# Patient Record
Sex: FEMALE | Race: WHITE | Employment: FULL TIME | URBAN - METROPOLITAN AREA
[De-identification: names, ages, dates, MRNs, and addresses within clinical notes are randomized per-mention and may not be internally consistent; named-entity substitution may affect disease eponyms.]

---

## 2017-01-27 ENCOUNTER — OFFICE VISIT (OUTPATIENT)
Dept: INTERNAL MEDICINE CLINIC | Facility: CLINIC | Age: 27
End: 2017-01-27

## 2017-01-27 VITALS
DIASTOLIC BLOOD PRESSURE: 68 MMHG | RESPIRATION RATE: 16 BRPM | HEIGHT: 64 IN | SYSTOLIC BLOOD PRESSURE: 114 MMHG | HEART RATE: 64 BPM | WEIGHT: 203 LBS | BODY MASS INDEX: 34.66 KG/M2

## 2017-01-27 DIAGNOSIS — F32.A DEPRESSION, UNSPECIFIED DEPRESSION TYPE: ICD-10-CM

## 2017-01-27 DIAGNOSIS — E66.9 OBESITY (BMI 30-39.9): ICD-10-CM

## 2017-01-27 DIAGNOSIS — Z51.81 ENCOUNTER FOR THERAPEUTIC DRUG MONITORING: Primary | ICD-10-CM

## 2017-01-27 PROCEDURE — 99213 OFFICE O/P EST LOW 20 MIN: CPT | Performed by: INTERNAL MEDICINE

## 2017-01-27 NOTE — PROGRESS NOTES
CC: Patient presents with:  Weight Check: up 1 lb       HPI:   Obesity, doing well on topamax and metformin, no chest pan. Mood is good. Current Outpatient Prescriptions:  Empagliflozin (JARDIANCE) 10 MG Oral Tab Take 10 mg by mouth daily.  Disp auscultation bilat   CARDIO: RRR without murmur    No orders of the defined types were placed in this encounter. Signed Prescriptions Disp Refills    Empagliflozin (JARDIANCE) 10 MG Oral Tab 30 tablet 5      Sig: Take 10 mg by mouth daily.

## 2017-02-09 ENCOUNTER — TELEPHONE (OUTPATIENT)
Dept: FAMILY MEDICINE CLINIC | Facility: CLINIC | Age: 27
End: 2017-02-09

## 2017-03-09 ENCOUNTER — OFFICE VISIT (OUTPATIENT)
Dept: INTERNAL MEDICINE CLINIC | Facility: CLINIC | Age: 27
End: 2017-03-09

## 2017-03-09 VITALS
RESPIRATION RATE: 16 BRPM | HEART RATE: 68 BPM | WEIGHT: 205 LBS | SYSTOLIC BLOOD PRESSURE: 128 MMHG | DIASTOLIC BLOOD PRESSURE: 78 MMHG | BODY MASS INDEX: 35 KG/M2 | HEIGHT: 64 IN

## 2017-03-09 DIAGNOSIS — Z51.81 ENCOUNTER FOR THERAPEUTIC DRUG MONITORING: Primary | ICD-10-CM

## 2017-03-09 DIAGNOSIS — Z15.89 HETEROZYGOUS MTHFR MUTATION C677T: ICD-10-CM

## 2017-03-09 DIAGNOSIS — F32.A DEPRESSION, UNSPECIFIED DEPRESSION TYPE: ICD-10-CM

## 2017-03-09 DIAGNOSIS — E66.9 OBESITY (BMI 30-39.9): ICD-10-CM

## 2017-03-09 PROCEDURE — 99213 OFFICE O/P EST LOW 20 MIN: CPT | Performed by: INTERNAL MEDICINE

## 2017-03-09 NOTE — PROGRESS NOTES
CC: Patient presents with:  Weight Check: up 2 lb       HPI:   Obesity, doing well on metformin and topamax. Stopped jardiance as not helping. No chest pain.        Current Outpatient Prescriptions:  Empagliflozin (JARDIANCE) 10 MG Oral Tab Take by mo CARDIO: RRR without murmur    No orders of the defined types were placed in this encounter.         No prescriptions requested or ordered in this encounter      None     ASSESSMENT:   Encounter for therapeutic drug monitoring  (primary encounter diagnosis

## 2017-03-30 ENCOUNTER — OFFICE VISIT (OUTPATIENT)
Dept: INTERNAL MEDICINE CLINIC | Facility: CLINIC | Age: 27
End: 2017-03-30

## 2017-03-30 VITALS
DIASTOLIC BLOOD PRESSURE: 78 MMHG | WEIGHT: 203 LBS | RESPIRATION RATE: 16 BRPM | HEART RATE: 80 BPM | HEIGHT: 64 IN | SYSTOLIC BLOOD PRESSURE: 118 MMHG | BODY MASS INDEX: 34.66 KG/M2

## 2017-03-30 DIAGNOSIS — F32.A DEPRESSION, UNSPECIFIED DEPRESSION TYPE: ICD-10-CM

## 2017-03-30 DIAGNOSIS — Z51.81 ENCOUNTER FOR THERAPEUTIC DRUG MONITORING: Primary | ICD-10-CM

## 2017-03-30 DIAGNOSIS — E66.9 OBESITY (BMI 30-39.9): ICD-10-CM

## 2017-03-30 DIAGNOSIS — Z15.89 HETEROZYGOUS MTHFR MUTATION C677T: ICD-10-CM

## 2017-03-30 PROCEDURE — 99213 OFFICE O/P EST LOW 20 MIN: CPT | Performed by: INTERNAL MEDICINE

## 2017-03-30 NOTE — PROGRESS NOTES
CC: Patient presents with:  Weight Check: down 2 lb       HPI:   Obesity, doing well on saxenda, topamax and metformin. Mood is good.        Current Outpatient Prescriptions:  Liraglutide -Weight Management (SAXENDA) 18 MG/3ML Subcutaneous Solution Pe and in no apparent distress, A/O x3  HEENT: atraumatic, normocephalic, throat is clear, PERRLA  LUNGS: clear to auscultation bilat   CARDIO: RRR without murmur    No orders of the defined types were placed in this encounter.         No prescriptions request

## 2017-04-05 ENCOUNTER — TELEPHONE (OUTPATIENT)
Dept: INTERNAL MEDICINE CLINIC | Facility: CLINIC | Age: 27
End: 2017-04-05

## 2017-04-05 NOTE — TELEPHONE ENCOUNTER
Patient notified that Dr. Kira Antony is aware and agrees to having patient decrease to previous dose.

## 2017-04-05 NOTE — TELEPHONE ENCOUNTER
Requesting Decrease dose of Saxenda  LOV: 3/30/17  RTC: 4 weeks  Increased Saxenda from 0.6 to 1.2  Future Appointments  Date Time Provider Beth Lange   4/27/2017 10:45 AM Jean Claude Johnston MD [facility] [address]   5/9/2017 6:15 PM Rick Aguila MD [facility]

## 2017-04-17 ENCOUNTER — TELEPHONE (OUTPATIENT)
Dept: FAMILY MEDICINE CLINIC | Facility: CLINIC | Age: 27
End: 2017-04-17

## 2017-04-18 NOTE — TELEPHONE ENCOUNTER
Per Medicare, Metformin has been denied when its being used for anorexia, weight loss, or weight gain are excluded from coverage under Medicare rules. She can wait until she sees Dr. Augusto Rosales on 4/27/17 or she can get a savings card at [facility].   Rocco

## 2017-04-19 NOTE — TELEPHONE ENCOUNTER
Informed patient of below message. Looked on [employer] for patient and informed her that at [place] with coupon the cash price would be $43.47. If she went to [facility], [employer] with discount card cash price would be $27.71.   Patient states that she is l

## 2017-04-27 ENCOUNTER — OFFICE VISIT (OUTPATIENT)
Dept: INTERNAL MEDICINE CLINIC | Facility: CLINIC | Age: 27
End: 2017-04-27

## 2017-04-27 VITALS
WEIGHT: 206 LBS | RESPIRATION RATE: 16 BRPM | DIASTOLIC BLOOD PRESSURE: 78 MMHG | SYSTOLIC BLOOD PRESSURE: 128 MMHG | HEART RATE: 80 BPM | HEIGHT: 64 IN | BODY MASS INDEX: 35.17 KG/M2

## 2017-04-27 DIAGNOSIS — Z51.81 ENCOUNTER FOR THERAPEUTIC DRUG MONITORING: Primary | ICD-10-CM

## 2017-04-27 DIAGNOSIS — F32.A DEPRESSION, UNSPECIFIED DEPRESSION TYPE: ICD-10-CM

## 2017-04-27 DIAGNOSIS — E66.9 OBESITY (BMI 30-39.9): ICD-10-CM

## 2017-04-27 PROBLEM — E66.01 SEVERE OBESITY (BMI 35.0-39.9) WITH COMORBIDITY (HCC): Chronic | Status: ACTIVE | Noted: 2017-04-27

## 2017-04-27 PROCEDURE — 99213 OFFICE O/P EST LOW 20 MIN: CPT | Performed by: INTERNAL MEDICINE

## 2017-05-05 PROCEDURE — 80307 DRUG TEST PRSMV CHEM ANLYZR: CPT | Performed by: FAMILY MEDICINE

## 2017-05-19 ENCOUNTER — PATIENT MESSAGE (OUTPATIENT)
Dept: INTERNAL MEDICINE CLINIC | Facility: CLINIC | Age: 27
End: 2017-05-19

## 2017-05-22 NOTE — TELEPHONE ENCOUNTER
From: Annette Ash  To: Madelyn Swann MD  Sent: 5/19/2017 9:53 PM CDT  Subject: Visit Follow-up Question    Hi Dr. Poonam Greenwood-    I misplaced the referral you gave me during out last visit. It was for an internal medicine doctor.  Could you send me her conta

## 2017-05-25 ENCOUNTER — TELEPHONE (OUTPATIENT)
Dept: INTERNAL MEDICINE CLINIC | Facility: CLINIC | Age: 27
End: 2017-05-25

## 2017-05-26 NOTE — TELEPHONE ENCOUNTER
Patient was seen by Dr. Stacie Hunter 4/27/17 and it is noted:    PLAN:  1. Obesity/depression/MTHFR mutaiton, pt with 1 lbs wt gain on 2 months of 0.6mg of saxenda, topamax and metformin. Stopped diethylpropion as interaction with psych meds.  Pt with total wt lo

## 2017-05-26 NOTE — TELEPHONE ENCOUNTER
From: Caitlin Canela  To: Mame Moreno MD  Sent: 5/26/2017 10:47 AM CDT  Subject: Other    Hello,    Thank you for sending me the information regarding Dr. Susan Barrow (she is an integrative medicine DrMartin with [facility]).  I called there to try and make an ap

## 2017-08-10 ENCOUNTER — OFFICE VISIT (OUTPATIENT)
Dept: INTERNAL MEDICINE CLINIC | Facility: CLINIC | Age: 27
End: 2017-08-10

## 2017-08-10 VITALS
BODY MASS INDEX: 35.68 KG/M2 | HEIGHT: 64 IN | DIASTOLIC BLOOD PRESSURE: 74 MMHG | WEIGHT: 209 LBS | HEART RATE: 86 BPM | SYSTOLIC BLOOD PRESSURE: 118 MMHG | RESPIRATION RATE: 16 BRPM

## 2017-08-10 DIAGNOSIS — E66.9 OBESITY (BMI 30-39.9): ICD-10-CM

## 2017-08-10 DIAGNOSIS — F32.A DEPRESSION, UNSPECIFIED DEPRESSION TYPE: ICD-10-CM

## 2017-08-10 DIAGNOSIS — Z51.81 ENCOUNTER FOR THERAPEUTIC DRUG MONITORING: Primary | ICD-10-CM

## 2017-08-10 DIAGNOSIS — F25.9 SCHIZOAFFECTIVE DISORDER, UNSPECIFIED TYPE (HCC): ICD-10-CM

## 2017-08-10 PROCEDURE — 99213 OFFICE O/P EST LOW 20 MIN: CPT | Performed by: INTERNAL MEDICINE

## 2017-08-10 RX ORDER — PHENTERMINE HYDROCHLORIDE 15 MG/1
15 CAPSULE ORAL EVERY MORNING
Qty: 30 CAPSULE | Refills: 0 | Status: SHIPPED | OUTPATIENT
Start: 2017-08-10 | End: 2017-10-02

## 2017-08-10 NOTE — PROGRESS NOTES
CC: Patient presents with:  Weight Check: up 3 lbs       HPI:   Obesity, worsening wt gain, trying to watch her diet. Depression symptoms doing well, f/u with psych doc.        Current Outpatient Prescriptions:  Phentermine HCl 15 MG Oral Cap Take 1 cap by mouth every morning. None     ASSESSMENT:   Encounter for therapeutic drug monitoring  (primary encounter diagnosis)  Obesity (BMI 30-39. 9)  Depression, unspecified depression type  Schizoaffective disorder, unspecified type ([facility]     PLAN:  1.

## 2017-09-01 PROCEDURE — 82607 VITAMIN B-12: CPT | Performed by: INTERNAL MEDICINE

## 2017-09-12 ENCOUNTER — OFFICE VISIT (OUTPATIENT)
Dept: INTERNAL MEDICINE CLINIC | Facility: CLINIC | Age: 27
End: 2017-09-12

## 2017-09-12 VITALS
WEIGHT: 211 LBS | DIASTOLIC BLOOD PRESSURE: 78 MMHG | HEIGHT: 64 IN | BODY MASS INDEX: 36.02 KG/M2 | RESPIRATION RATE: 16 BRPM | SYSTOLIC BLOOD PRESSURE: 122 MMHG

## 2017-09-12 DIAGNOSIS — E53.8 VITAMIN B 12 DEFICIENCY: ICD-10-CM

## 2017-09-12 DIAGNOSIS — Z51.81 ENCOUNTER FOR THERAPEUTIC DRUG MONITORING: Primary | ICD-10-CM

## 2017-09-12 DIAGNOSIS — E55.9 VITAMIN D DEFICIENCY: ICD-10-CM

## 2017-09-12 DIAGNOSIS — E66.01 SEVERE OBESITY (BMI 35.0-39.9) WITH COMORBIDITY (HCC): Chronic | ICD-10-CM

## 2017-09-12 PROCEDURE — 96372 THER/PROPH/DIAG INJ SC/IM: CPT | Performed by: INTERNAL MEDICINE

## 2017-09-12 PROCEDURE — 99213 OFFICE O/P EST LOW 20 MIN: CPT | Performed by: INTERNAL MEDICINE

## 2017-09-12 RX ORDER — ERGOCALCIFEROL 1.25 MG/1
CAPSULE ORAL
Qty: 8 CAPSULE | Refills: 5 | Status: SHIPPED | OUTPATIENT
Start: 2017-09-12 | End: 2018-08-28 | Stop reason: ALTCHOICE

## 2017-09-12 RX ORDER — PHENTERMINE HYDROCHLORIDE 37.5 MG/1
37.5 TABLET ORAL
Qty: 30 TABLET | Refills: 0 | Status: SHIPPED | OUTPATIENT
Start: 2017-09-12 | End: 2017-12-08

## 2017-09-12 RX ORDER — CYANOCOBALAMIN 1000 UG/ML
1000 INJECTION INTRAMUSCULAR; SUBCUTANEOUS ONCE
Status: COMPLETED | OUTPATIENT
Start: 2017-09-12 | End: 2017-09-12

## 2017-09-12 RX ADMIN — CYANOCOBALAMIN 1000 MCG: 1000 INJECTION INTRAMUSCULAR; SUBCUTANEOUS at 17:38:00

## 2017-09-12 NOTE — PROGRESS NOTES
CC: Patient presents with:  Weight Check: up 2 lb       HPI:   Obesity, doing well on phentermine, no problems with anxiety. No chest pain.        Current Outpatient Prescriptions:  ergocalciferol 73736 units Oral Cap Take 1 cap daily twice weekly Disp: this encounter.        Signed Prescriptions Disp Refills    ergocalciferol 39535 units Oral Cap 8 capsule 5      Sig: Take 1 cap daily twice weekly      Phentermine HCl 37.5 MG Oral Tab 30 tablet 0      Sig: Take 1 tablet (37.5 mg total) by mouth every morn

## 2017-09-27 PROBLEM — F25.9 SCHIZO AFFECTIVE SCHIZOPHRENIA (HCC): Status: ACTIVE | Noted: 2017-09-27

## 2017-09-28 PROBLEM — F25.9 SCHIZO AFFECTIVE SCHIZOPHRENIA (HCC): Status: RESOLVED | Noted: 2017-09-27 | Resolved: 2017-09-28

## 2017-12-04 PROBLEM — F25.0 SCHIZOAFFECTIVE DISORDER, BIPOLAR TYPE (HCC): Status: ACTIVE | Noted: 2017-12-04

## 2018-02-26 NOTE — TELEPHONE ENCOUNTER
From: Jennifer Pratt  Sent: 2/23/2018 1:08 PM CST  Subject: Medication Renewal Request    Jennifer Pratt would like a refill of the following medications:     ergocalciferol 01194 units Oral Cap Bijal Tinajero MD]    [facility]

## 2018-02-26 NOTE — TELEPHONE ENCOUNTER
Medication(s) to Refill:   Pending Prescriptions Disp Refills    ergocalciferol 98174 units Oral Cap 8 capsule 5     Sig: Take 1 cap daily twice weekly           Last Time Medication was Filled:  9/12/17    Last Office Visit with PCP: 9/12/2017    When Curtis Noble

## 2018-02-27 RX ORDER — ERGOCALCIFEROL 1.25 MG/1
CAPSULE ORAL
Qty: 8 CAPSULE | Refills: 0
Start: 2018-02-27

## 2018-07-26 ENCOUNTER — OFFICE VISIT (OUTPATIENT)
Dept: INTERNAL MEDICINE CLINIC | Facility: CLINIC | Age: 28
End: 2018-07-26
Payer: COMMERCIAL

## 2018-07-26 VITALS
HEIGHT: 65 IN | BODY MASS INDEX: 39.99 KG/M2 | SYSTOLIC BLOOD PRESSURE: 110 MMHG | HEART RATE: 80 BPM | WEIGHT: 240 LBS | RESPIRATION RATE: 16 BRPM | DIASTOLIC BLOOD PRESSURE: 70 MMHG

## 2018-07-26 DIAGNOSIS — E55.9 VITAMIN D DEFICIENCY: ICD-10-CM

## 2018-07-26 DIAGNOSIS — F32.A DEPRESSION, UNSPECIFIED DEPRESSION TYPE: ICD-10-CM

## 2018-07-26 DIAGNOSIS — Z51.81 ENCOUNTER FOR THERAPEUTIC DRUG MONITORING: Primary | ICD-10-CM

## 2018-07-26 DIAGNOSIS — E53.8 LOW VITAMIN B12 LEVEL: ICD-10-CM

## 2018-07-26 DIAGNOSIS — Z15.89 HETEROZYGOUS MTHFR MUTATION C677T: ICD-10-CM

## 2018-07-26 DIAGNOSIS — E66.01 SEVERE OBESITY (BMI 35.0-39.9) WITH COMORBIDITY (HCC): Chronic | ICD-10-CM

## 2018-07-26 PROCEDURE — 99214 OFFICE O/P EST MOD 30 MIN: CPT | Performed by: NURSE PRACTITIONER

## 2018-07-26 RX ORDER — LIRAGLUTIDE 6 MG/ML
1.8 INJECTION SUBCUTANEOUS DAILY
Qty: 3 PEN | Refills: 1 | Status: SHIPPED | OUTPATIENT
Start: 2018-07-26 | End: 2018-08-28

## 2018-07-26 NOTE — PATIENT INSTRUCTIONS
Plan:  Continue with medications: victoza (check to see how expensive at pharm), if too expensive need to ask insurance company if they cover injectable medication  Go to the lab for blood work   Follow up with me in 1 month  Schedule follow up appointment

## 2018-07-26 NOTE — PROGRESS NOTES
HISTORY OF PRESENT ILLNESS  Patient presents with:  Weight Check: phentermine with dr Kory Baker. Osman Johnson is a 32year old female here for follow up with medical weight loss program for the treatment of overweight, obesity, or morbid obesity.   Fan Treviño +depression, schiozaffective disorder  Constipation: negative  DVT: negative  Family or personal history of Pancreatic issues / Medullary Thyroid Cancer: negative  History of bariatric surgery: negative    REVIEW OF SYSTEMS  GENERAL: negative for activity 44 12/04/2017   BILT 0.3 12/04/2017   TP 6.1 12/04/2017   ALB 3.1 (L) 12/04/2017   AGRATIO 1.9 06/05/2013    12/04/2017   K 3.6 12/04/2017    12/04/2017   CO2 24.0 12/04/2017       Lab Results  Component Value Date    09/08/2016   A1C 5. Misc    (E66.01) Severe obesity (BMI 35.0-39. 9) with comorbidity ([facility]  Plan: HEMOGLOBIN D2N, COMP METABOLIC PANEL (14),         VITAMIN B12, OP REFERRAL TO DIETITIAN [facility]         ([facility] USE ONLY), VICTOZA 18 MG/3ML Subcutaneous         Solution P skip meals   -Read nutrition labels and keep a food log  -drink a lot of water 65 oz of water per day  - Do not drink your calories (no soda, juice, high calorie coffee drinks, limit alcohol)  - Do not eat late at night  · FITTE: ACSM recommendations (150- meditation, clarity, sleep, and abida to your daily life. Return in about 4 weeks (around 8/23/2018) for weight management. Patient verbalizes understanding.     CHINYERE Pedro

## 2018-07-31 ENCOUNTER — TELEPHONE (OUTPATIENT)
Dept: INTERNAL MEDICINE CLINIC | Facility: CLINIC | Age: 28
End: 2018-07-31

## 2018-07-31 DIAGNOSIS — E66.01 SEVERE OBESITY (BMI 35.0-39.9) WITH COMORBIDITY (HCC): Chronic | ICD-10-CM

## 2018-07-31 DIAGNOSIS — Z51.81 ENCOUNTER FOR THERAPEUTIC DRUG MONITORING: ICD-10-CM

## 2018-07-31 NOTE — TELEPHONE ENCOUNTER
Patient called stating no needles were prescribed, informed patient that they were sent to mail order. Patient requested a new rx to be sent to local pharmacy.

## 2018-08-28 ENCOUNTER — OFFICE VISIT (OUTPATIENT)
Dept: INTERNAL MEDICINE CLINIC | Facility: CLINIC | Age: 28
End: 2018-08-28
Payer: COMMERCIAL

## 2018-08-28 VITALS
HEART RATE: 87 BPM | RESPIRATION RATE: 16 BRPM | HEIGHT: 65 IN | DIASTOLIC BLOOD PRESSURE: 70 MMHG | SYSTOLIC BLOOD PRESSURE: 118 MMHG | WEIGHT: 232 LBS | BODY MASS INDEX: 38.65 KG/M2

## 2018-08-28 DIAGNOSIS — E53.8 LOW VITAMIN B12 LEVEL: ICD-10-CM

## 2018-08-28 DIAGNOSIS — E66.01 SEVERE OBESITY (BMI 35.0-39.9) WITH COMORBIDITY (HCC): Chronic | ICD-10-CM

## 2018-08-28 DIAGNOSIS — E55.9 VITAMIN D DEFICIENCY: ICD-10-CM

## 2018-08-28 DIAGNOSIS — Z51.81 ENCOUNTER FOR THERAPEUTIC DRUG MONITORING: Primary | ICD-10-CM

## 2018-08-28 PROCEDURE — 99214 OFFICE O/P EST MOD 30 MIN: CPT | Performed by: NURSE PRACTITIONER

## 2018-08-28 RX ORDER — LIRAGLUTIDE 6 MG/ML
1.8 INJECTION SUBCUTANEOUS DAILY
Qty: 3 PEN | Refills: 1 | Status: SHIPPED | OUTPATIENT
Start: 2018-08-28 | End: 2018-11-20

## 2018-08-28 NOTE — PATIENT INSTRUCTIONS
Plan:  Continue with medications: victoza 1.8mg daily   Follow up with me in 1 month  Schedule follow up appointments: Yadira Solorzano (dietitian)    Please try to work on the following dietary changes:  1.  Drink lots of water and cut down on soda/juice consum

## 2018-08-28 NOTE — PROGRESS NOTES
HISTORY OF PRESENT ILLNESS  Patient presents with:  Weight Check: patient is here for 1 month follow up, weight down by 8 pounds    Coletta Boeck is a 32year old female here for follow up with medical weight loss program for the treatment of overweight negative  +MTHFR  Renal disease: negative   Kidney stones: negative   Liver disease: negative  Joint-related conditions:negative  Migraines/seizures: +seizure in 2016  Glaucoma: negative   Depression/anxiety: +depression, schiozaffective disorder  Constipa 12/04/2017       Lab Results  Component Value Date   GLU 77 12/04/2017   BUN 8 12/04/2017   CREATSERUM 0.51 (L) 12/04/2017    12/04/2017   CA 8.5 12/04/2017   ALKPHO 48 12/04/2017   AST 18 12/04/2017   ALT 44 12/04/2017   BILT 0.3 12/04/2017   TP 6. MG/3ML Subcutaneous Solution         Pen-injector    (E66.01) Severe obesity (BMI 35.0-39. 9) with comorbidity ([address]  Plan: VICTOZA 18 MG/3ML Subcutaneous Solution         Pen-injector    (E55.9) Vitamin D deficiency    (E53.8) Low vitamin B12 level    Init alcohol)  - Do not eat late at night  · FITTE: ACSM recommendations (150-300 minutes/ week in active weight loss)   · Discussed the role of sleep and stress in weight management    Labs ordered today:  b12,    Patient Instructions   Plan:  Continue with me

## 2018-09-18 ENCOUNTER — OFFICE VISIT (OUTPATIENT)
Dept: INTERNAL MEDICINE CLINIC | Facility: CLINIC | Age: 28
End: 2018-09-18

## 2018-09-18 VITALS — WEIGHT: 232 LBS | BODY MASS INDEX: 39 KG/M2

## 2018-09-18 DIAGNOSIS — E66.01 SEVERE OBESITY (BMI 35.0-39.9) WITH COMORBIDITY (HCC): Primary | Chronic | ICD-10-CM

## 2018-09-18 PROCEDURE — G0447 BEHAVIOR COUNSEL OBESITY 15M: HCPCS | Performed by: DIETITIAN, REGISTERED

## 2018-09-18 NOTE — PROGRESS NOTES
FOLLOW UP NUTRITION CONSULTATION    Nutrition Assessment    Number of consultations with dietitian: 4    Height:  Ht Readings from Last 1 Encounters:  08/28/18 : 65\"      Weight:   Wt Readings from Last 2 Encounters:  09/18/18 : 232 lb  08/28/18 : 232 dad.    Goals:  · Increase protein intake to 75 gms/d  · Buy healthy grab and go snacks (list developed)  · Make time to eat meals    Monitoring/Evaluation: Patient encouraged to schedule follow up appt in 2 months.   Scheduled 11/18/18        Ember Constantino

## 2018-09-25 ENCOUNTER — OFFICE VISIT (OUTPATIENT)
Dept: INTERNAL MEDICINE CLINIC | Facility: CLINIC | Age: 28
End: 2018-09-25

## 2018-09-25 VITALS
WEIGHT: 229 LBS | HEART RATE: 84 BPM | HEIGHT: 64 IN | SYSTOLIC BLOOD PRESSURE: 118 MMHG | DIASTOLIC BLOOD PRESSURE: 72 MMHG | BODY MASS INDEX: 39.09 KG/M2 | RESPIRATION RATE: 16 BRPM

## 2018-09-25 DIAGNOSIS — Z15.89 HETEROZYGOUS MTHFR MUTATION C677T: ICD-10-CM

## 2018-09-25 DIAGNOSIS — E55.9 VITAMIN D DEFICIENCY: ICD-10-CM

## 2018-09-25 DIAGNOSIS — E53.8 VITAMIN B 12 DEFICIENCY: ICD-10-CM

## 2018-09-25 DIAGNOSIS — Z51.81 ENCOUNTER FOR THERAPEUTIC DRUG MONITORING: Primary | ICD-10-CM

## 2018-09-25 DIAGNOSIS — F25.9 SCHIZOAFFECTIVE DISORDER, UNSPECIFIED TYPE (HCC): ICD-10-CM

## 2018-09-25 PROCEDURE — 99214 OFFICE O/P EST MOD 30 MIN: CPT | Performed by: NURSE PRACTITIONER

## 2018-09-25 NOTE — PATIENT INSTRUCTIONS
Keep up the great work!! PLAN:  Continue with medications: continue with victoza 1.8mg  Follow up with me in 1 month  Schedule follow up appointments:  Dietitian/nutritionist     Please try to work on the following dietary changes:  1.  Drink lots of gamal

## 2018-09-25 NOTE — PROGRESS NOTES
HISTORY OF PRESENT ILLNESS  Patient presents with:  Weight Check: down 3 lbs    Vicenta Christina is a 32year old female here for follow up with medical weight loss program for the treatment of overweight, obesity, or morbid obesity.  Patient has lost -# 3 use: didn't discuss   Supplements: none  Exercise/Activity: none  Nutrition: eating regular meals, no tracking reports  Behavior: mood stable, good sleep    MEDICAL HISTORY  PMH reviewed:   Cardiac disorders:negative    Diabetes: negative  Thyroid disease: HGB 13.2 12/04/2017    HCT 39.9 12/04/2017    MCV 89.1 12/04/2017    MCH 29.5 12/04/2017    MCHC 33.1 12/04/2017    RDW 11.4 (L) 12/04/2017    .0 12/04/2017     Lab Results   Component Value Date    GLU 77 12/04/2017    BUN 8 12/04/2017    CREATS tablet (80 mg total) by mouth daily with dinner. Disp: 90 tablet Rfl: 0   LORazepam 0.5 MG Oral Tab Take 1 tablet (0.5 mg total) by mouth 2 (two) times daily. Disp: 180 tablet Rfl: 0     No current facility-administered medications on file prior to visit. behavioral psych with her current one)  -low carb high protein  -portion control  -Limit carbohydrates  -Eat breakfast every day   -Don't skip meals   -Read nutrition labels and keep a food log  -drink a lot of water 65 oz of water per day  - Do not drink exercise towards calorie number per day)     ** Daily INPUT> Look at nutrition section-- \"nutrients\" and it will break down your macros for the day (ie. Protein, carbs, fibers, sugars and fats). Try to stay within these numbers daily    2.  \"7 minute wor

## 2018-10-08 DIAGNOSIS — E66.01 SEVERE OBESITY (BMI 35.0-39.9) WITH COMORBIDITY (HCC): Chronic | ICD-10-CM

## 2018-10-08 DIAGNOSIS — Z51.81 ENCOUNTER FOR THERAPEUTIC DRUG MONITORING: ICD-10-CM

## 2018-10-08 RX ORDER — LIRAGLUTIDE 6 MG/ML
1.8 INJECTION SUBCUTANEOUS DAILY
Qty: 3 PEN | Refills: 1 | Status: CANCELLED | OUTPATIENT
Start: 2018-10-08

## 2018-10-08 RX ORDER — LIRAGLUTIDE 6 MG/ML
1.8 INJECTION SUBCUTANEOUS DAILY
Qty: 3 PEN | Refills: 2 | Status: SHIPPED | OUTPATIENT
Start: 2018-10-08 | End: 2018-11-20

## 2018-10-08 NOTE — TELEPHONE ENCOUNTER
Requesting   Requested Prescriptions     Pending Prescriptions Disp Refills   • VICTOZA 18 MG/3ML Subcutaneous Solution Pen-injector 3 pen 1     Sig: Inject 1.8 mg into the skin daily.  Start therapy with: Week 1- .6mg daily Week 2- 1.2mg daily Week 3 and b

## 2018-10-23 ENCOUNTER — OFFICE VISIT (OUTPATIENT)
Dept: INTERNAL MEDICINE CLINIC | Facility: CLINIC | Age: 28
End: 2018-10-23

## 2018-10-23 VITALS
HEART RATE: 86 BPM | RESPIRATION RATE: 16 BRPM | DIASTOLIC BLOOD PRESSURE: 70 MMHG | SYSTOLIC BLOOD PRESSURE: 120 MMHG | BODY MASS INDEX: 38.41 KG/M2 | WEIGHT: 225 LBS | HEIGHT: 64 IN

## 2018-10-23 DIAGNOSIS — Z15.89 HETEROZYGOUS MTHFR MUTATION C677T: ICD-10-CM

## 2018-10-23 DIAGNOSIS — Z51.81 ENCOUNTER FOR THERAPEUTIC DRUG MONITORING: Primary | ICD-10-CM

## 2018-10-23 DIAGNOSIS — E53.8 LOW VITAMIN B12 LEVEL: ICD-10-CM

## 2018-10-23 DIAGNOSIS — E55.9 VITAMIN D DEFICIENCY: ICD-10-CM

## 2018-10-23 DIAGNOSIS — F25.9 SCHIZOAFFECTIVE DISORDER, UNSPECIFIED TYPE (HCC): ICD-10-CM

## 2018-10-23 PROCEDURE — 99214 OFFICE O/P EST MOD 30 MIN: CPT | Performed by: NURSE PRACTITIONER

## 2018-10-23 NOTE — PATIENT INSTRUCTIONS
Keep up the great work! !     PLAN:  Continue with medications: victoza 1.8mg  Follow up with me in 1 month  Schedule follow up appointments:  Dietitian/nutritionist      Please try to work on the following dietary changes:  1.  Drink lots of water and cut d daily life.

## 2018-10-23 NOTE — PROGRESS NOTES
HISTORY OF PRESENT ILLNESS  Patient presents with:  Weight Check: down 4 lb    Osman Johnson is a 32year old female here for follow up with medical weight loss program for the treatment of overweight, obesity, or morbid obesity.  Patient has lost -# 4 l none  ETOH use: didn't discuss   Supplements: none  Exercise/Activity: none  Nutrition: eating regular meals, no tracking reports  Behavior: mood stable, good sleep    MEDICAL HISTORY  PMH reviewed:   Cardiac disorders:negative    Diabetes: negative  Thyro 12/04/2017    HGB 13.2 12/04/2017    HCT 39.9 12/04/2017    MCV 89.1 12/04/2017    MCH 29.5 12/04/2017    MCHC 33.1 12/04/2017    RDW 11.4 (L) 12/04/2017    .0 12/04/2017     Lab Results   Component Value Date    GLU 77 12/04/2017    BUN 8 12/04/201 VICTOZA 18 MG/3ML Subcutaneous Solution Pen-injector Inject 1.8 mg into the skin daily.  Start therapy with: Week 1- .6mg daily Week 2- 1.2mg daily Week 3 and beyond- 1.8mg daily Disp: 3 pen Rfl: 1   Insulin Pen Needle (BD PEN NEEDLE ARIEL U/F) 32G X 4 MM success. See patient instruction below for more details.   · Schedule appointment with dietitian Adam Drake) and psychologist (if ok to see our behavioral psych with her current one)  -low carb high protein  -portion control  -Limit carbohydrates  -Eat br carbs                With My Fitness Pal-->When you set-up the emery or need to adjust settings:                Goals should include:                  Lose 1.5-2 lbs per week                Activity level: not very active (can't count exercise towards calorie

## 2018-11-13 ENCOUNTER — OFFICE VISIT (OUTPATIENT)
Dept: INTERNAL MEDICINE CLINIC | Facility: CLINIC | Age: 28
End: 2018-11-13
Payer: COMMERCIAL

## 2018-11-13 VITALS — BODY MASS INDEX: 39 KG/M2 | WEIGHT: 226 LBS

## 2018-11-13 DIAGNOSIS — E66.9 OBESITY, CLASS II, BMI 35-39.9: Primary | ICD-10-CM

## 2018-11-13 PROCEDURE — G0447 BEHAVIOR COUNSEL OBESITY 15M: HCPCS | Performed by: DIETITIAN, REGISTERED

## 2018-11-13 NOTE — PROGRESS NOTES
FOLLOW UP NUTRITION CONSULTATION    Nutrition Assessment    Number of consultations with dietitian: 5    Height:  Ht Readings from Last 1 Encounters:  10/23/18 : 64\"      Weight:   Wt Readings from Last 2 Encounters:  11/13/18 : 226 lb  10/23/18 : 225

## 2018-11-20 ENCOUNTER — OFFICE VISIT (OUTPATIENT)
Dept: INTERNAL MEDICINE CLINIC | Facility: CLINIC | Age: 28
End: 2018-11-20
Payer: COMMERCIAL

## 2018-11-20 VITALS
SYSTOLIC BLOOD PRESSURE: 120 MMHG | DIASTOLIC BLOOD PRESSURE: 70 MMHG | HEIGHT: 64 IN | BODY MASS INDEX: 39.09 KG/M2 | RESPIRATION RATE: 16 BRPM | HEART RATE: 88 BPM | WEIGHT: 229 LBS

## 2018-11-20 DIAGNOSIS — Z51.81 ENCOUNTER FOR THERAPEUTIC DRUG MONITORING: Primary | ICD-10-CM

## 2018-11-20 DIAGNOSIS — E78.5 DYSLIPIDEMIA: ICD-10-CM

## 2018-11-20 DIAGNOSIS — Z15.89 HETEROZYGOUS MTHFR MUTATION C677T: ICD-10-CM

## 2018-11-20 DIAGNOSIS — E55.9 VITAMIN D DEFICIENCY: ICD-10-CM

## 2018-11-20 DIAGNOSIS — E66.01 SEVERE OBESITY (BMI 35.0-39.9) WITH COMORBIDITY (HCC): Chronic | ICD-10-CM

## 2018-11-20 DIAGNOSIS — E53.8 LOW VITAMIN B12 LEVEL: ICD-10-CM

## 2018-11-20 PROCEDURE — 99213 OFFICE O/P EST LOW 20 MIN: CPT | Performed by: NURSE PRACTITIONER

## 2018-11-20 RX ORDER — LIRAGLUTIDE 6 MG/ML
1.8 INJECTION SUBCUTANEOUS DAILY
Qty: 9 PEN | Refills: 0 | Status: SHIPPED | OUTPATIENT
Start: 2018-11-20 | End: 2019-03-04 | Stop reason: ALTCHOICE

## 2018-11-20 NOTE — PROGRESS NOTES
HISTORY OF PRESENT ILLNESS  Patient presents with:  Weight Check: up 4 lbs    Drea Rios is a 25year old female here for follow up with medical weight loss program for the treatment of overweight, obesity, or morbid obesity.  Patient has up -#4  lbs Social hx and lifestyle reviewed:    Work: in grad school for psych and art  Marital status: boyfriend, lives with parents    Support: yes  Tobacco use: none  ETOH use: didn't discuss   Supplements: none  Exercise/Activity: none  Nutrition: eating regu Component Value Date    WBC 7.5 12/04/2017    RBC 4.48 12/04/2017    HGB 13.2 12/04/2017    HCT 39.9 12/04/2017    MCV 89.1 12/04/2017    MCH 29.5 12/04/2017    MCHC 33.1 12/04/2017    RDW 11.4 (L) 12/04/2017    .0 12/04/2017     Lab Results   Com MM Does not apply Misc Inject 1 each into the skin daily. Disp: 90 each Rfl: 1     No current facility-administered medications on file prior to visit.      ASSESSMENT/PLAN  (Z51.81) Encounter for therapeutic drug monitoring  (primary encounter diagnosis) management for success. See patient instruction below for more details.   · Schedule appointment with dietitian Moises Hardy) and psychologist (if ok to see our behavioral psych with her current one)  -low carb high protein  -portion control  -Limit carbohy you will be able to see if you are eating the right amount of calories, protein, carbs                With My Fitness Pal-->When you set-up the emery or need to adjust settings:                Goals should include:                  Lose 1.5-2 lbs per week

## 2018-11-20 NOTE — PATIENT INSTRUCTIONS
PLAN:  Continue with medications:   victoza injectable:  Week 1- 0.6mg  Week 2- 1.2mg  Week 3- 1.8mg (stay at this dose)  Continue with exercise- yoga, cardio  Follow up with me in 1 month   Recommendations from Dietitian/nutritionist      Please try to wo helps with mindfulness, meditation, clarity, sleep, and abida to your daily life.

## 2018-11-29 ENCOUNTER — TELEPHONE (OUTPATIENT)
Dept: INTERNAL MEDICINE CLINIC | Facility: CLINIC | Age: 28
End: 2018-11-29

## 2018-12-07 PROCEDURE — 87624 HPV HI-RISK TYP POOLED RSLT: CPT | Performed by: FAMILY MEDICINE

## 2018-12-07 PROCEDURE — 88175 CYTOPATH C/V AUTO FLUID REDO: CPT | Performed by: FAMILY MEDICINE

## 2018-12-16 DIAGNOSIS — E66.01 SEVERE OBESITY (BMI 35.0-39.9) WITH COMORBIDITY (HCC): Chronic | ICD-10-CM

## 2018-12-16 DIAGNOSIS — Z51.81 ENCOUNTER FOR THERAPEUTIC DRUG MONITORING: ICD-10-CM

## 2018-12-17 NOTE — TELEPHONE ENCOUNTER
Requesting     Requested Prescriptions     Pending Prescriptions Disp Refills   • Insulin Pen Needle (BD PEN NEEDLE ARIEL U/F) 32G X 4 MM Does not apply Misc 90 each 1     Sig: Inject 1 each into the skin daily.      LOV: 11/20/18  RTC: 4 weeks  Filled: 7/31

## 2018-12-22 DIAGNOSIS — E66.01 SEVERE OBESITY (BMI 35.0-39.9) WITH COMORBIDITY (HCC): Chronic | ICD-10-CM

## 2018-12-22 DIAGNOSIS — Z51.81 ENCOUNTER FOR THERAPEUTIC DRUG MONITORING: ICD-10-CM

## 2018-12-24 RX ORDER — LIRAGLUTIDE 6 MG/ML
INJECTION SUBCUTANEOUS
Qty: 9 ML | Refills: 0 | OUTPATIENT
Start: 2018-12-24

## 2018-12-24 NOTE — TELEPHONE ENCOUNTER
Requesting Victoza  LOV: 11/20/18  RTC: 4 weeks  Last Relevant Labs: n/a  Filled: 11/20/18 #9 pens with 0 refills    Future Appointments   Date Time Provider Beth Lange   1/29/2019  3:00 PM [facility]     Rx was sent to ma

## 2019-02-11 DIAGNOSIS — Z51.81 ENCOUNTER FOR THERAPEUTIC DRUG MONITORING: ICD-10-CM

## 2019-02-11 DIAGNOSIS — E66.01 SEVERE OBESITY (BMI 35.0-39.9) WITH COMORBIDITY (HCC): Chronic | ICD-10-CM

## 2019-02-13 DIAGNOSIS — E66.01 SEVERE OBESITY (BMI 35.0-39.9) WITH COMORBIDITY (HCC): Chronic | ICD-10-CM

## 2019-02-13 DIAGNOSIS — Z51.81 ENCOUNTER FOR THERAPEUTIC DRUG MONITORING: ICD-10-CM

## 2019-02-13 NOTE — TELEPHONE ENCOUNTER
Requesting Changtoza  LOV: 11/20/18  RTC: 4 weeks  Last Relevant Labs: 2017  Filled: 11/20/18 #9 pens with 0 refills    Future Appointments   Date Time Provider Beth Lange   3/29/2019 12:30 PM Rosanna Brandt MD [facility]     Patient is overd

## 2019-02-15 RX ORDER — LIRAGLUTIDE 6 MG/ML
INJECTION SUBCUTANEOUS
Qty: 9 ML | Refills: 0 | OUTPATIENT
Start: 2019-02-15

## 2019-02-15 NOTE — TELEPHONE ENCOUNTER
Requesting Victoza  LOV: 11/20/18  RTC: 4 weeks  Last Relevant Labs: 12/ 6/17  Filled: 11/20/18 #9 pens with 0 refills    Future Appointments   Date Time Provider Beth Lange   3/29/2019 12:30 PM Kisha Kapadia MD [facility]     Patient is d

## 2019-02-20 RX ORDER — LIRAGLUTIDE 6 MG/ML
INJECTION SUBCUTANEOUS
Qty: 9 ML | Refills: 0 | OUTPATIENT
Start: 2019-02-20

## 2019-02-20 NOTE — TELEPHONE ENCOUNTER
See notes - patient returned my call and the front informed her she is overdue to be seen and she did not schedule.  Denied refill

## 2020-05-05 PROBLEM — G25.1 TREMOR DUE TO MULTIPLE DRUGS: Status: ACTIVE | Noted: 2020-05-05

## 2020-07-28 ENCOUNTER — HOSPITAL ENCOUNTER (EMERGENCY)
Facility: HOSPITAL | Age: 30
Discharge: HOME | End: 2020-07-29
Attending: EMERGENCY MEDICINE
Payer: MEDICARE

## 2020-07-28 VITALS
HEART RATE: 92 BPM | BODY MASS INDEX: 37.83 KG/M2 | OXYGEN SATURATION: 97 % | HEIGHT: 65 IN | TEMPERATURE: 98 F | WEIGHT: 227.06 LBS

## 2020-07-28 DIAGNOSIS — R45.851 DEPRESSION WITH SUICIDAL IDEATION: ICD-10-CM

## 2020-07-28 DIAGNOSIS — F32.A DEPRESSION WITH SUICIDAL IDEATION: ICD-10-CM

## 2020-07-28 DIAGNOSIS — Z20.822 COVID-19 RULED OUT BY LABORATORY TESTING: Primary | ICD-10-CM

## 2020-07-28 LAB — SARS-COV-2 RNA RESP QL NAA+PROBE: NOT DETECTED

## 2020-07-28 PROCEDURE — 99285 EMERGENCY DEPT VISIT HI MDM: CPT

## 2020-07-28 PROCEDURE — 99283 EMERGENCY DEPT VISIT LOW MDM: CPT

## 2020-07-29 PROBLEM — F25.1 SCHIZOAFFECTIVE DISORDER, DEPRESSIVE TYPE (HCC): Status: ACTIVE | Noted: 2020-07-29

## 2020-07-29 NOTE — ED PROVIDER NOTES
Patient Seen in: [facility] Emergency Department      History   Patient presents with:  Testing    Stated Complaint: going to [facility], needs rapid covid    HPI    25-year-old female sent from the notes for COVID testing has no other complaints.   Patient is medically cleared              Disposition and Plan     Clinical Impression:  [id number] ruled out by laboratory testing  (primary encounter diagnosis)  Depression with suicidal ideation    Disposition:  Discharge  7/28/2020  9:49 pm    Follow-up:  Vidhi Wills

## 2020-07-29 NOTE — ED NOTES
Updated pt regarding pending transport. Pt ambulated with steady gait to the bathroom, offered crackers and water but declined. Will continue to monitor.

## 2020-07-29 NOTE — ED INITIAL ASSESSMENT (HPI)
Pt went to [facility] for assessment for sI, needs rapid covid prior to admission.

## 2021-12-23 PROBLEM — E55.9 VITAMIN D INSUFFICIENCY: Status: ACTIVE | Noted: 2021-12-23

## 2021-12-23 PROBLEM — E66.01 MORBID OBESITY WITH BMI OF 40.0-44.9, ADULT (HCC): Status: ACTIVE | Noted: 2021-12-23

## 2021-12-23 PROBLEM — R63.2 BINGE EATING: Status: ACTIVE | Noted: 2021-12-23

## (undated) NOTE — MR AVS SNAPSHOT
[address] [phone]               Thank you for choosing us for your health care visit with Annie Btaeman MD.  We are glad to serve you and happy to provide you with this summary of TAKE 1 TABLET BY MOUTH EVERY 4 HOURS AS NEEDED FOR ANXIETY   Commonly known as:  ATIVAN           lurasidone HCl 80 MG Tabs   TAKE 1 TABLET(80 MG) BY MOUTH DAILY WITH BREAKFAST   Commonly known as:  LATUDA           MetFORMIN HCl  MG Tb24   Take 3 ta

## (undated) NOTE — Clinical Note
Dear Cecilio Cosme MD Our mutual Landen Barahona  is participating in our medical weight-loss program. We have been working together on making lifestyle changes regarding nutrition, behaviors, and physical activity.   Please feel free to contact

## (undated) NOTE — MR AVS SNAPSHOT
[address] [phone]               Thank you for choosing us for your health care visit with Biju Tate MD.  We are glad to serve you and happy to provide you with this summary of TAKE 1 TABLET(80 MG) BY MOUTH DAILY WITH BREAKFAST   Commonly known as:  LATUDA           MetFORMIN HCl  MG Tb24   Take 3 tablets (2,250 mg total) by mouth daily.    Commonly known as:  GLUCOPHAGE-XR           MULTI-VITAMIN Tabs   1 TABLET DAILY

## (undated) NOTE — MR AVS SNAPSHOT
[address] [phone]               Thank you for choosing us for your health care visit with Jose Gary MD.  We are glad to serve you and happy to provide you with this summary of MetFORMIN HCl  MG Tb24   Take 3 tablets (2,250 mg total) by mouth daily. Commonly known as:  GLUCOPHAGE-XR           MULTI-VITAMIN Tabs   1 TABLET DAILY           topiramate 50 MG Tabs   Take 1 tablet (50 mg total) by mouth 2 (two) times daily.

## (undated) NOTE — MR AVS SNAPSHOT
[address] [phone]               Thank you for choosing us for your health care visit with Uli Hughes MD.  We are glad to serve you and happy to provide you with this summary of MetFORMIN HCl  MG Tb24   Take 3 tablets (2,250 mg total) by mouth daily.    Commonly known as:  GLUCOPHAGE-XR           MULTI-VITAMIN Tabs   1 TABLET DAILY           SAXENDA 18 MG/3ML Sopn   Generic drug:  Liraglutide -Weight Management   Inject 0.6